# Patient Record
Sex: MALE | Race: BLACK OR AFRICAN AMERICAN | URBAN - METROPOLITAN AREA
[De-identification: names, ages, dates, MRNs, and addresses within clinical notes are randomized per-mention and may not be internally consistent; named-entity substitution may affect disease eponyms.]

---

## 2021-07-07 ENCOUNTER — EMERGENCY (EMERGENCY)
Facility: HOSPITAL | Age: 31
LOS: 1 days | Discharge: HOME | End: 2021-07-07
Attending: EMERGENCY MEDICINE
Payer: MEDICAID

## 2021-07-07 VITALS
HEART RATE: 84 BPM | DIASTOLIC BLOOD PRESSURE: 94 MMHG | RESPIRATION RATE: 17 BRPM | SYSTOLIC BLOOD PRESSURE: 134 MMHG | OXYGEN SATURATION: 98 % | WEIGHT: 149.91 LBS | TEMPERATURE: 98 F

## 2021-07-07 PROCEDURE — 87186 SC STD MICRODIL/AGAR DIL: CPT

## 2021-07-07 PROCEDURE — 10061 I&D ABSCESS COMP/MULTIPLE: CPT

## 2021-07-07 PROCEDURE — 99283 EMERGENCY DEPT VISIT LOW MDM: CPT | Mod: 25

## 2021-07-07 PROCEDURE — 87075 CULTR BACTERIA EXCEPT BLOOD: CPT

## 2021-07-07 PROCEDURE — 99284 EMERGENCY DEPT VISIT MOD MDM: CPT | Mod: 25

## 2021-07-07 PROCEDURE — 87070 CULTURE OTHR SPECIMN AEROBIC: CPT

## 2021-07-07 PROCEDURE — 87077 CULTURE AEROBIC IDENTIFY: CPT

## 2021-07-07 RX ORDER — CEPHALEXIN 500 MG
1 CAPSULE ORAL
Qty: 30 | Refills: 0
Start: 2021-07-07 | End: 2021-07-16

## 2021-07-07 RX ORDER — AZTREONAM 2 G
1 VIAL (EA) INJECTION
Qty: 20 | Refills: 0
Start: 2021-07-07 | End: 2021-07-16

## 2021-07-07 NOTE — ED ADULT TRIAGE NOTE - CHIEF COMPLAINT QUOTE
Patient BIBA from  post where he has arrived yesterday for substance abuse treatment. Pt states that he was injecting drugs into his right arm and has been having pain and redness at the site for the last week. Abscess noted to area. Pt denies any fevers or chills.

## 2021-07-07 NOTE — ED PROVIDER NOTE - OBJECTIVE STATEMENT
pt comes to the ER for abscess to the Right antecubital area that he noticed within the last two days. Did have mild stiffness/pain to the area that began after he injected himself with heroin needle 5 days ago, Denies limitations to elbow, denies fever. He currently is in rehab center for his heroin, cocaine crack addiction.  Denies any allergies and has no other medical conditions.  Lungs clear, NSR S1 S2 right antecubital area red, warm, firm and tender to touch with little fluctuance to the center pt comes to the ER for abscess to the Right antecubital area that he noticed within the last two days. Did have mild stiffness/pain to the area that began after he injected himself with heroin needle 5 days ago, Denies limitations to elbow, denies fever. He currently is in rehab center for his heroin, cocaine crack addiction.  Denies any allergies and has no other medical conditions.

## 2021-07-07 NOTE — ED PROVIDER NOTE - ATTENDING CONTRIBUTION TO CARE
I, Radu Patel, have personally performed a face to face diagnostic evaluation on this patient. I have reviewed the ACP note and agree with the history, exam and plan of care, except as noted.    32 yo M sent in from [facility] for abscess of right antecubital area secondary to IV drug abuse. bedside ultrasound showed fluid collection. I&D performed. patient given keflex and bactrim.

## 2021-07-07 NOTE — ED PROVIDER NOTE - PHYSICAL EXAMINATION
Lungs clear, NSR S1 S2 right antecubital area red, warm, firm and tender to touch with little fluctuance to the center Lungs clear, NSR S1 S2    right antecubital area red, warm, firm and tender to touch with little fluctuance to the center, no lymphoadenopathy no streaking, distal sensory and ROM intact

## 2021-07-07 NOTE — ED PROVIDER NOTE - NSFOLLOWUPINSTRUCTIONS_ED_ALL_ED_FT
Keep covered   Keep dry  take the two antibiotics until completed  Return to ER on Friday to have packing removed

## 2021-07-07 NOTE — ED PROVIDER NOTE - PATIENT PORTAL LINK FT
You can access the FollowMyHealth Patient Portal offered by [facility] by registering at the following website: [email or web address]. By joining [facility]s FollowMyHealth portal, you will also be able to view your health information using other applications (apps) compatible with our system.

## 2021-07-08 NOTE — ED POST DISCHARGE NOTE - DETAILS
culture resistant to Keflex, advised to c/w Bactrim, unable to leave msg, phone number on file just keeps ringing